# Patient Record
Sex: FEMALE | Race: BLACK OR AFRICAN AMERICAN | URBAN - METROPOLITAN AREA
[De-identification: names, ages, dates, MRNs, and addresses within clinical notes are randomized per-mention and may not be internally consistent; named-entity substitution may affect disease eponyms.]

---

## 2022-09-21 ENCOUNTER — OFFICE VISIT (OUTPATIENT)
Dept: NEUROLOGY | Age: 72
End: 2022-09-21
Payer: MEDICARE

## 2022-09-21 VITALS
HEIGHT: 58 IN | OXYGEN SATURATION: 98 % | WEIGHT: 122 LBS | HEART RATE: 74 BPM | SYSTOLIC BLOOD PRESSURE: 130 MMHG | RESPIRATION RATE: 20 BRPM | BODY MASS INDEX: 25.61 KG/M2 | DIASTOLIC BLOOD PRESSURE: 84 MMHG

## 2022-09-21 DIAGNOSIS — G43.009 MIGRAINE WITHOUT AURA AND WITHOUT STATUS MIGRAINOSUS, NOT INTRACTABLE: Primary | ICD-10-CM

## 2022-09-21 DIAGNOSIS — Z86.73 HISTORY OF TRANSIENT ISCHEMIC ATTACK (TIA): ICD-10-CM

## 2022-09-21 PROCEDURE — 1101F PT FALLS ASSESS-DOCD LE1/YR: CPT | Performed by: PSYCHIATRY & NEUROLOGY

## 2022-09-21 PROCEDURE — G8417 CALC BMI ABV UP PARAM F/U: HCPCS | Performed by: PSYCHIATRY & NEUROLOGY

## 2022-09-21 PROCEDURE — G8432 DEP SCR NOT DOC, RNG: HCPCS | Performed by: PSYCHIATRY & NEUROLOGY

## 2022-09-21 PROCEDURE — G8536 NO DOC ELDER MAL SCRN: HCPCS | Performed by: PSYCHIATRY & NEUROLOGY

## 2022-09-21 PROCEDURE — 1123F ACP DISCUSS/DSCN MKR DOCD: CPT | Performed by: PSYCHIATRY & NEUROLOGY

## 2022-09-21 PROCEDURE — 1090F PRES/ABSN URINE INCON ASSESS: CPT | Performed by: PSYCHIATRY & NEUROLOGY

## 2022-09-21 PROCEDURE — G8400 PT W/DXA NO RESULTS DOC: HCPCS | Performed by: PSYCHIATRY & NEUROLOGY

## 2022-09-21 PROCEDURE — 99204 OFFICE O/P NEW MOD 45 MIN: CPT | Performed by: PSYCHIATRY & NEUROLOGY

## 2022-09-21 PROCEDURE — G8427 DOCREV CUR MEDS BY ELIG CLIN: HCPCS | Performed by: PSYCHIATRY & NEUROLOGY

## 2022-09-21 PROCEDURE — 3017F COLORECTAL CA SCREEN DOC REV: CPT | Performed by: PSYCHIATRY & NEUROLOGY

## 2022-09-21 RX ORDER — MONTELUKAST SODIUM 10 MG/1
TABLET ORAL
COMMUNITY
Start: 2021-12-14

## 2022-09-21 RX ORDER — BUDESONIDE AND FORMOTEROL FUMARATE DIHYDRATE 160; 4.5 UG/1; UG/1
AEROSOL RESPIRATORY (INHALATION) EVERY 12 HOURS
COMMUNITY

## 2022-09-21 RX ORDER — CARVEDILOL 12.5 MG/1
TABLET ORAL
COMMUNITY

## 2022-09-21 RX ORDER — FAMOTIDINE 20 MG/1
TABLET, FILM COATED ORAL
COMMUNITY
Start: 2021-11-27

## 2022-09-21 RX ORDER — AMLODIPINE BESYLATE 5 MG/1
TABLET ORAL
COMMUNITY
Start: 2021-11-28

## 2022-09-21 NOTE — PROGRESS NOTES
2013 TIA   Headaches - pretty good they aren't bad   Not very often, haven't had one in a month thye are getting better

## 2022-09-21 NOTE — PROGRESS NOTES
[facility] and [address] at [facility] at [address] [facility], [address] [facility] [address] [facility], [address] [place] [address]  [phone] Office  [phone]           Referring: Abigail Adriana, [address] [facility],  [facility] [address]     Chief Complaint   Patient presents with    New Patient     Headaches, Dr. Ingrid Girard pt      71-year-old lady who presents today for neurologic consultation regarding headache and TIA history. She follows with Dr. Arnol Feldman for a number of years for headaches and she would take Fioricet when she had headache. In 2013 she had the abrupt onset of slurred speech and left sided weakness and she went to [facility] where she received alteplase. She regained all function. She was said to have had a TIA. She is unsure what her MRI showed but again she regained complete function and she would follow with Dr. Malcolm Golden for that as well. She was initially put on 325 mg of aspirin and then that was decreased to 162 mg and now she is on 81 mg of aspirin. She is unable to tolerate statin medication so she watches her diet to help keep her cholesterol controlled. She stays active. She has had no further stroke symptoms. In terms of her headaches they are infrequent and she will take a Tylenol. She does have some butalbital on hand just in case she gets a a migrainous type headache  Past Medical History:   Diagnosis Date    Aortic valve disorders 4/5/2011    Asthma     CVA (cerebral infarction)     10/13 left sided weakness treated with TPA at [facility]     Mitral valve disorders(424.0) 4/5/2011    Palpitations 4/5/2011    Rheumatoid arthritis(714.0)     Shortness of breath 4/5/2011       History reviewed. No pertinent surgical history.     Current Outpatient Medications   Medication Sig Dispense Refill    ubidecarenone (COQ-10 PO) CoQ-10      amLODIPine (NORVASC) 5 mg tablet amlodipine 5 mg tablet   TAKE 1 TABLET BY MOUTH EVERY DAY      budesonide-formoteroL (SYMBICORT) 160-4.5 mcg/actuation HFAA every twelve (12) hours. carboxymethylcellulose sodium 1 % drop Artificial Tears (carboxymethylcellulose) 1 % eye drops      carvediloL (COREG) 12.5 mg tablet carvedilol 12.5 mg tablet   TAKE 1 TABLET BY MOUTH TWICE A DAY WITH FOOD      famotidine (PEPCID) 20 mg tablet famotidine 20 mg tablet   TAKE 1 TABLET BY MOUTH TWICE A DAY FOR 90 DAYS      montelukast (SINGULAIR) 10 mg tablet montelukast 10 mg tablet   TAKE 1 TABLET BY MOUTH EVERY DAY FOR 90 DAYS      aspirin (ASPIRIN) 325 mg tablet Take 81 mg by mouth daily. ALPRAZolam (XANAX) 0.5 mg tablet Take  by mouth nightly as needed. butalbital-acetaminophen-caffeine -40 mg cap Take 325 mg by mouth as needed. fexofenadine (ALLEGRA) 180 mg tablet Take  by mouth daily. levothyroxine (SYNTHROID) 50 mcg tablet Take  by mouth daily (before breakfast). 2 ON Sunday      ERGOCALCIFEROL, VITAMIN D2, (VITAMIN D PO) Take every three days. pindolol (VISKIN) 5 mg tablet Take 1 tablet by mouth two (2) times a day. (Patient not taking: Reported on 9/21/2022) 60 tablet 0    simvastatin (ZOCOR) 10 mg tablet Take  by mouth nightly. (Patient not taking: Reported on 9/21/2022)      predniSONE (DELTASONE) 1 mg tablet Take 4 mg by mouth daily (with breakfast). (Patient not taking: Reported on 9/21/2022)      buPROPion SR (WELLBUTRIN SR) 150 mg SR tablet Take  by mouth daily. (Patient not taking: Reported on 9/21/2022)      aMILoride (MIDAMOR) 5 mg tablet Take  by mouth daily. (Patient not taking: Reported on 9/21/2022)      gabapentin (NEURONTIN) 100 mg tablet Take 100 mg by mouth three (3) times daily. (Patient not taking: Reported on 9/21/2022)      amitriptyline (ELAVIL) 50 mg tablet Take  by mouth nightly.  (Patient not taking: Reported on 9/21/2022)      ibandronate (Poppy Overall) 150 mg tablet Take 150 mg by mouth every thirty (30) days. (Patient not taking: Reported on 2022)      mirtazapine (REMERON) 15 mg tablet Take  by mouth nightly. (Patient not taking: Reported on 2022)      CALCIUM CITRATE/VITAMIN D3 (CITRACAL + D PO) Take 2 Tabs by mouth daily. (Patient not taking: Reported on 2022)      fluticasone propionate (FLONASE) 50 mcg/actuation nasal spray by Nasal route nightly. Administer to  nostril. (Patient not taking: Reported on 703)      folic acid 205 mcg tablet Take 400 mcg by mouth two (2) times a day. (Patient not taking: Reported on 2022)      lansoprazole (PREVACID) 30 mg capsule Take  by mouth daily (before breakfast). (Patient not taking: Reported on 2022)            Allergies   Allergen Reactions    Cefzil [Cefprozil] Unknown (comments)    Percocet [Oxycodone-Acetaminophen] Unknown (comments)    Sulfa (Sulfonamide Antibiotics) Unknown (comments)       Social History     Tobacco Use    Smoking status: Never       History reviewed. No pertinent family history. Her father had a stroke as well as heart disease and he  of heart disease. Her mother had a stroke and also dementia. Review of Systems  Pertinent positives and negatives as noted. Examination  Visit Vitals  /84 (BP 1 Location: Left upper arm, BP Patient Position: Sitting, BP Cuff Size: Adult)   Pulse 74   Resp 20   Ht 4' 10\" (1.473 m)   Wt 55.3 kg (122 lb)   SpO2 98%   BMI 25.50 kg/m²     She is a very pleasant engaging lady. She is awake alert and oriented. Her speech and language are normal.  Cognition is normal.  Cranial nerves are intact 2-12. She has no nystagmus. There is no pronation drift or abnormal movement. She generates full strength in the upper and lower extremities in all muscle groups to direct testing. Her reflexes are symmetrical and she has no ataxia. She ambulates with a cane.     Impression/Plan  78-year-old lady with history of transient neurologic symptoms strokelike resolved after alteplase question TIA and that is what she was told versus minimal deficits stroke but in any regard her examination is normal  Continue to modify modifiable risk factors for stroke  Continue aspirin 81 mg daily  She did reiterate calling 911 if she had stroke symptoms. She has some Fioricet right now so we will defer refill  We will give her names of some primary care physician so she can get established as all of the physicians and her current practice of retiring  Follow-up with me in 1 year unless needed sooner    Star Reese MD          This note was created using voice recognition software. Despite editing, there may be syntax errors.

## 2023-05-20 RX ORDER — FAMOTIDINE 20 MG/1
TABLET, FILM COATED ORAL
COMMUNITY
Start: 2021-11-27

## 2023-05-20 RX ORDER — MONTELUKAST SODIUM 10 MG/1
TABLET ORAL
COMMUNITY
Start: 2021-12-14

## 2023-05-20 RX ORDER — BUPROPION HYDROCHLORIDE 150 MG/1
TABLET, EXTENDED RELEASE ORAL DAILY
COMMUNITY

## 2023-05-20 RX ORDER — PREDNISONE 1 MG/1
4 TABLET ORAL
COMMUNITY

## 2023-05-20 RX ORDER — ASPIRIN 325 MG
81 TABLET ORAL DAILY
COMMUNITY

## 2023-05-20 RX ORDER — ALPRAZOLAM 0.5 MG/1
TABLET ORAL
COMMUNITY

## 2023-05-20 RX ORDER — CARVEDILOL 12.5 MG/1
TABLET ORAL
COMMUNITY

## 2023-05-20 RX ORDER — AMILORIDE HYDROCHLORIDE 5 MG/1
TABLET ORAL DAILY
COMMUNITY

## 2023-05-20 RX ORDER — LANSOPRAZOLE 30 MG/1
CAPSULE, DELAYED RELEASE ORAL
COMMUNITY

## 2023-05-20 RX ORDER — PINDOLOL 5 MG/1
5 TABLET ORAL 2 TIMES DAILY
COMMUNITY
Start: 2014-12-29

## 2023-05-20 RX ORDER — IBANDRONATE SODIUM 150 MG/1
150 TABLET, FILM COATED ORAL
COMMUNITY

## 2023-05-20 RX ORDER — LEVOTHYROXINE SODIUM 0.05 MG/1
TABLET ORAL
COMMUNITY

## 2023-05-20 RX ORDER — SIMVASTATIN 10 MG
TABLET ORAL
COMMUNITY

## 2023-05-20 RX ORDER — MIRTAZAPINE 15 MG/1
TABLET, FILM COATED ORAL
COMMUNITY

## 2023-05-20 RX ORDER — FLUTICASONE PROPIONATE 50 MCG
SPRAY, SUSPENSION (ML) NASAL
COMMUNITY

## 2023-05-20 RX ORDER — LANOLIN ALCOHOL/MO/W.PET/CERES
400 CREAM (GRAM) TOPICAL 2 TIMES DAILY
COMMUNITY
Start: 2011-04-05

## 2023-05-20 RX ORDER — AMITRIPTYLINE HYDROCHLORIDE 50 MG/1
TABLET, FILM COATED ORAL
COMMUNITY

## 2023-05-20 RX ORDER — AMLODIPINE BESYLATE 5 MG/1
TABLET ORAL
COMMUNITY
Start: 2021-11-28

## 2023-05-20 RX ORDER — FEXOFENADINE HCL 180 MG/1
TABLET ORAL DAILY
COMMUNITY

## 2023-09-21 ENCOUNTER — OFFICE VISIT (OUTPATIENT)
Age: 73
End: 2023-09-21
Payer: MEDICARE

## 2023-09-21 VITALS
SYSTOLIC BLOOD PRESSURE: 122 MMHG | HEART RATE: 71 BPM | DIASTOLIC BLOOD PRESSURE: 70 MMHG | RESPIRATION RATE: 18 BRPM | WEIGHT: 119 LBS | BODY MASS INDEX: 24.98 KG/M2 | HEIGHT: 58 IN | OXYGEN SATURATION: 96 %

## 2023-09-21 DIAGNOSIS — G43.009 MIGRAINE WITHOUT AURA, NOT INTRACTABLE, WITHOUT STATUS MIGRAINOSUS: Primary | ICD-10-CM

## 2023-09-21 DIAGNOSIS — Z86.73 PERSONAL HISTORY OF TRANSIENT ISCHEMIC ATTACK (TIA), AND CEREBRAL INFARCTION WITHOUT RESIDUAL DEFICITS: ICD-10-CM

## 2023-09-21 PROCEDURE — 99213 OFFICE O/P EST LOW 20 MIN: CPT | Performed by: PSYCHIATRY & NEUROLOGY

## 2023-09-21 PROCEDURE — 1123F ACP DISCUSS/DSCN MKR DOCD: CPT | Performed by: PSYCHIATRY & NEUROLOGY

## 2023-09-21 RX ORDER — UBIDECARENONE 50 MG
CAPSULE ORAL
COMMUNITY

## 2023-09-21 ASSESSMENT — PATIENT HEALTH QUESTIONNAIRE - PHQ9
SUM OF ALL RESPONSES TO PHQ QUESTIONS 1-9: 0
1. LITTLE INTEREST OR PLEASURE IN DOING THINGS: 0
SUM OF ALL RESPONSES TO PHQ QUESTIONS 1-9: 0
SUM OF ALL RESPONSES TO PHQ QUESTIONS 1-9: 0
2. FEELING DOWN, DEPRESSED OR HOPELESS: 0
SUM OF ALL RESPONSES TO PHQ9 QUESTIONS 1 & 2: 0
SUM OF ALL RESPONSES TO PHQ QUESTIONS 1-9: 0

## 2023-09-21 NOTE — PROGRESS NOTES
(BONIVA) 150 MG tablet Take 1 tablet by mouth every 30 days (Patient not taking: Reported on 9/21/2023)      lansoprazole (PREVACID) 30 MG delayed release capsule Take by mouth every morning (before breakfast) (Patient not taking: Reported on 9/21/2023)      mirtazapine (REMERON) 15 MG tablet Take by mouth (Patient not taking: Reported on 9/21/2023)      pindolol (VISKEN) 5 MG tablet Take 1 tablet by mouth 2 times daily (Patient not taking: Reported on 9/21/2023)      predniSONE (DELTASONE) 1 MG tablet Take 4 tablets by mouth daily (with breakfast) (Patient not taking: Reported on 9/21/2023)      simvastatin (ZOCOR) 10 MG tablet Take by mouth (Patient not taking: Reported on 9/21/2023)       No current facility-administered medications for this visit. Allergies   Allergen Reactions    Oxycodone Rash    Valsartan Shortness Of Breath    Cefprozil      Other reaction(s): Unknown (comments)    Oxycodone-Acetaminophen      Other reaction(s): Unknown (comments)    Pravastatin      Muscle Aches    Rosuvastatin      Muscle Aches    Simvastatin        uscle Aches    Sulfa Antibiotics      Other reaction(s): Unknown (comments)    Tramadol Itching    Zetia [Ezetimibe] Other (See Comments)     Muscle aches    Levofloxacin Nausea And Vomiting     Social History     Tobacco Use    Smoking status: Never     71-year-old lady following up. I saw her around this time last year. She followed with Dr. Reema Ruano prior to his group home. She also has history of TIA back in 2013. She was maintained on 81 mg aspirin. She was intolerant of statin medications. She has been on Tylenol and Fioricet in the p[ast.  Today she reports no migraines. She will get a mild headache and uses some Tylenol maybe once a month. No further strokelike symptoms. She continues with her aspirin.     Examination  /70 (Site: Right Upper Arm, Position: Sitting, Cuff Size: Medium Adult)   Pulse 71   Resp 18   Ht 4' 10\" (1.473 m)   Wt 119

## 2024-07-23 ENCOUNTER — OFFICE VISIT (OUTPATIENT)
Age: 74
End: 2024-07-23
Payer: MEDICARE

## 2024-07-23 VITALS
BODY MASS INDEX: 25.82 KG/M2 | HEIGHT: 58 IN | WEIGHT: 123 LBS | SYSTOLIC BLOOD PRESSURE: 120 MMHG | HEART RATE: 72 BPM | DIASTOLIC BLOOD PRESSURE: 82 MMHG | RESPIRATION RATE: 16 BRPM | OXYGEN SATURATION: 94 %

## 2024-07-23 DIAGNOSIS — M26.622 ARTHRALGIA OF LEFT TEMPOROMANDIBULAR JOINT: ICD-10-CM

## 2024-07-23 DIAGNOSIS — H92.02 LEFT EAR PAIN: Primary | ICD-10-CM

## 2024-07-23 PROCEDURE — 1123F ACP DISCUSS/DSCN MKR DOCD: CPT

## 2024-07-23 PROCEDURE — 99214 OFFICE O/P EST MOD 30 MIN: CPT

## 2024-07-23 RX ORDER — AMLODIPINE BESYLATE 5 MG/1
5 TABLET ORAL DAILY
COMMUNITY

## 2024-07-23 RX ORDER — AZITHROMYCIN 250 MG/1
TABLET, FILM COATED ORAL
Qty: 6 TABLET | Refills: 0 | Status: SHIPPED | OUTPATIENT
Start: 2024-07-23 | End: 2024-08-02

## 2024-07-23 RX ORDER — ALBUTEROL SULFATE 2.5 MG/3ML
2.5 SOLUTION RESPIRATORY (INHALATION) EVERY 6 HOURS PRN
COMMUNITY

## 2024-07-23 RX ORDER — BUDESONIDE AND FORMOTEROL FUMARATE DIHYDRATE 160; 4.5 UG/1; UG/1
2 AEROSOL RESPIRATORY (INHALATION) PRN
COMMUNITY

## 2024-07-23 NOTE — PROGRESS NOTES
Chief Complaint   Patient presents with    Headache     Having headache and has had some ear problems, Pain shooting from left ear to top of her head. She did go to the ENT and does need hearing aids, but was asked to go to the neurologist due to shooting pain from ear to head     Vitals:    07/23/24 0835   BP: 120/82   Pulse: 72   Resp: 16   SpO2: 94%       
Services Required     Referred to Provider:   Gareth Magdaleno, DMD     Requested Specialty:   Dentistry     Number of Visits Requested:   1    amLODIPine (NORVASC) 5 MG tablet     Sig: Take 1 tablet by mouth daily    budesonide-formoterol (SYMBICORT) 160-4.5 MCG/ACT AERO     Sig: Inhale 2 puffs into the lungs as needed    albuterol (PROVENTIL) (2.5 MG/3ML) 0.083% nebulizer solution     Sig: Take 3 mLs by nebulization every 6 hours as needed for Wheezing    azithromycin (ZITHROMAX) 250 MG tablet     Simg on day 1 followed by 250mg on days 2 - 5     Dispense:  6 tablet     Refill:  0        1. Left ear pain    2. Arthralgia of left temporomandibular joint    Will give the patient a Z-Jake to see if this will help with the left ear pain.  Patient attempted to call her pharmacy to find out what eardrops she is currently taking but she could not get through.    Gave the patient a referral to Dr. Magdaleno for TMJ evaluation and treatment.  Also gave the patient a copy of the flyer from [facility] in case she is interested in TMJ massage therapy.  Patient will follow-up with us on an as-needed basis going forward.    Return if symptoms worsen or fail to improve.

## 2024-09-19 ENCOUNTER — OFFICE VISIT (OUTPATIENT)
Age: 74
End: 2024-09-19
Payer: MEDICARE

## 2024-09-19 VITALS
SYSTOLIC BLOOD PRESSURE: 118 MMHG | BODY MASS INDEX: 25.82 KG/M2 | WEIGHT: 123 LBS | HEIGHT: 58 IN | HEART RATE: 78 BPM | RESPIRATION RATE: 14 BRPM | DIASTOLIC BLOOD PRESSURE: 64 MMHG | OXYGEN SATURATION: 98 %

## 2024-09-19 DIAGNOSIS — Z86.73 PERSONAL HISTORY OF TRANSIENT ISCHEMIC ATTACK (TIA), AND CEREBRAL INFARCTION WITHOUT RESIDUAL DEFICITS: Primary | ICD-10-CM

## 2024-09-19 DIAGNOSIS — G43.009 MIGRAINE WITHOUT AURA, NOT INTRACTABLE, WITHOUT STATUS MIGRAINOSUS: ICD-10-CM

## 2024-09-19 PROCEDURE — 99213 OFFICE O/P EST LOW 20 MIN: CPT | Performed by: PSYCHIATRY & NEUROLOGY

## 2024-09-19 PROCEDURE — 1123F ACP DISCUSS/DSCN MKR DOCD: CPT | Performed by: PSYCHIATRY & NEUROLOGY

## 2024-09-19 RX ORDER — ASPIRIN 81 MG/1
81 TABLET, CHEWABLE ORAL DAILY
COMMUNITY

## 2024-09-19 RX ORDER — DESONIDE 0.5 MG/G
CREAM TOPICAL 2 TIMES DAILY
COMMUNITY

## 2024-12-19 ENCOUNTER — HOSPITAL ENCOUNTER (EMERGENCY)
Facility: HOSPITAL | Age: 74
Discharge: HOME | End: 2024-12-19
Attending: STUDENT IN AN ORGANIZED HEALTH CARE EDUCATION/TRAINING PROGRAM
Payer: MEDICARE

## 2024-12-19 VITALS
RESPIRATION RATE: 22 BRPM | BODY MASS INDEX: 26.41 KG/M2 | HEART RATE: 98 BPM | OXYGEN SATURATION: 96 % | HEIGHT: 59 IN | SYSTOLIC BLOOD PRESSURE: 143 MMHG | WEIGHT: 131 LBS | TEMPERATURE: 97.6 F | DIASTOLIC BLOOD PRESSURE: 89 MMHG

## 2024-12-19 DIAGNOSIS — R13.10 PILL DYSPHAGIA: Primary | ICD-10-CM

## 2024-12-19 PROCEDURE — 99283 EMERGENCY DEPT VISIT LOW MDM: CPT

## 2024-12-19 RX ORDER — ONDANSETRON 2 MG/ML
4 INJECTION INTRAMUSCULAR; INTRAVENOUS ONCE
Status: DISCONTINUED | OUTPATIENT
Start: 2024-12-19 | End: 2024-12-20 | Stop reason: HOSPADM

## 2024-12-19 RX ORDER — DIAZEPAM 10 MG/2ML
5 INJECTION, SOLUTION INTRAMUSCULAR; INTRAVENOUS ONCE
Status: DISCONTINUED | OUTPATIENT
Start: 2024-12-19 | End: 2024-12-20 | Stop reason: HOSPADM

## 2024-12-19 ASSESSMENT — PAIN SCALES - WONG BAKER: WONGBAKER_NUMERICALRESPONSE: HURTS WHOLE LOT

## 2024-12-19 ASSESSMENT — PAIN - FUNCTIONAL ASSESSMENT: PAIN_FUNCTIONAL_ASSESSMENT: WONG-BAKER FACES

## 2024-12-20 NOTE — ED PROVIDER NOTES
[facility] EMERGENCY DEPT  EMERGENCY DEPARTMENT ENCOUNTER      Pt Name: Azalea Rudd  MRN: [id number]  Birthdate 1950  Date of evaluation: 12/19/2024  Provider: Melanie Reis MD    CHIEF COMPLAINT       Chief Complaint   Patient presents with    Pill stuck in throat     HISTORY OF PRESENT ILLNESS   (Location/Symptom, Timing/Onset, Context/Setting, Quality, Duration, Modifying Factors, Severity)  Note limiting factors.   74-year-old female with past medical history of asthma, CVA, aortic valve and mitral valve disease, anxiety, GERD, presents to the ER with family for evaluation of sensation of pill being stuck in her throat. Patient report she was prescribed augmentin, and took her first dose PTA. States she does have a history of difficulty with swallowing large pills, and tried to break the Augmentin into smaller pieces. Despite this, however, the pill still became caught up/stuck in her throat. She reports feeling like it is still in her mid trachea, and has not moved. She is currently spitting out her secretions on exam, able to speak with some difficulty. She denies shortness of breath, wheezing, nausea, vomiting.         Review of External Medical Records:     Nursing Notes were reviewed.    REVIEW OF SYSTEMS    (2-9 systems for level 4, 10 or more for level 5)     Review of Systems   All other systems reviewed and are negative.      Except as noted above the remainder of the review of systems was reviewed and negative.       PAST MEDICAL HISTORY     Past Medical History:   Diagnosis Date    Aortic valve disorders 4/5/2011    Asthma     CVA (cerebral infarction)     10/13 left sided weakness treated with TPA at [facility]    Mitral valve disorders(424.0) 4/5/2011    Palpitations 4/5/2011    Rheumatoid arthritis(714.0)     Shortness of breath 4/5/2011         SURGICAL HISTORY     No past surgical history on file.      CURRENT MEDICATIONS       Previous Medications    ALBUTEROL (PROVENTIL) (2.5 MG/3ML)

## 2024-12-20 NOTE — ED TRIAGE NOTES
[facility]o amoxicillin pill being stuck in her throat starting 20 mins PTA.  Pt is drooling, airway remains open, pt having difficulty speaking.